# Patient Record
Sex: MALE | Race: WHITE | URBAN - METROPOLITAN AREA
[De-identification: names, ages, dates, MRNs, and addresses within clinical notes are randomized per-mention and may not be internally consistent; named-entity substitution may affect disease eponyms.]

---

## 2017-09-06 ENCOUNTER — TELEPHONE (OUTPATIENT)
Dept: FAMILY MEDICINE CLINIC | Age: 57
End: 2017-09-06

## 2017-09-06 RX ORDER — SULFAMETHOXAZOLE AND TRIMETHOPRIM 800; 160 MG/1; MG/1
1 TABLET ORAL 2 TIMES DAILY
Qty: 20 TABLET | Refills: 0 | Status: SHIPPED | OUTPATIENT
Start: 2017-09-06 | End: 2017-09-16

## 2018-09-14 ENCOUNTER — TELEPHONE (OUTPATIENT)
Dept: FAMILY MEDICINE CLINIC | Age: 58
End: 2018-09-14

## 2018-09-14 NOTE — TELEPHONE ENCOUNTER
Patient requesting a copy of all his prescriptions. Patient was in earlier to get his chart notes. Please contact patient.

## 2020-08-03 ENCOUNTER — TELEPHONE (OUTPATIENT)
Dept: FAMILY MEDICINE CLINIC | Age: 60
End: 2020-08-03

## 2020-08-03 NOTE — TELEPHONE ENCOUNTER
Pt called in wanting to speak to Charley. He would not elaborate as to what he needed to speak about.      Best call back number: [phone]

## 2020-08-04 RX ORDER — NICOTINE 21 MG/24HR
1 PATCH, TRANSDERMAL 24 HOURS TRANSDERMAL DAILY
Qty: 42 PATCH | Refills: 0 | Status: SHIPPED | OUTPATIENT
Start: 2020-08-04 | End: 2020-11-17 | Stop reason: SDUPTHER

## 2020-09-01 ENCOUNTER — OFFICE VISIT (OUTPATIENT)
Dept: FAMILY MEDICINE CLINIC | Age: 60
End: 2020-09-01
Payer: COMMERCIAL

## 2020-09-01 VITALS
DIASTOLIC BLOOD PRESSURE: 86 MMHG | OXYGEN SATURATION: 95 % | SYSTOLIC BLOOD PRESSURE: 138 MMHG | HEART RATE: 87 BPM | WEIGHT: 198 LBS | BODY MASS INDEX: 28.41 KG/M2 | RESPIRATION RATE: 16 BRPM

## 2020-09-01 PROBLEM — R03.0 WHITE COAT SYNDROME WITHOUT DIAGNOSIS OF HYPERTENSION: Status: ACTIVE | Noted: 2020-09-01

## 2020-09-01 LAB
A/G RATIO: 2.1 (ref 1.1–2.2)
ALBUMIN SERPL-MCNC: 4.5 G/DL (ref 3.4–5)
ALP BLD-CCNC: 47 U/L (ref 40–129)
ALT SERPL-CCNC: 17 U/L (ref 10–40)
ANION GAP SERPL CALCULATED.3IONS-SCNC: 11 MMOL/L (ref 3–16)
AST SERPL-CCNC: 16 U/L (ref 15–37)
BILIRUB SERPL-MCNC: 0.3 MG/DL (ref 0–1)
BUN BLDV-MCNC: 20 MG/DL (ref 7–20)
CALCIUM SERPL-MCNC: 9.5 MG/DL (ref 8.3–10.6)
CHLORIDE BLD-SCNC: 105 MMOL/L (ref 99–110)
CHOLESTEROL, TOTAL: 213 MG/DL (ref 0–199)
CO2: 24 MMOL/L (ref 21–32)
CREAT SERPL-MCNC: 1 MG/DL (ref 0.9–1.3)
GFR AFRICAN AMERICAN: >60
GFR NON-AFRICAN AMERICAN: >60
GLOBULIN: 2.1 G/DL
GLUCOSE BLD-MCNC: 103 MG/DL (ref 70–99)
HDLC SERPL-MCNC: 71 MG/DL (ref 40–60)
LDL CHOLESTEROL CALCULATED: 98 MG/DL
POTASSIUM SERPL-SCNC: 4.6 MMOL/L (ref 3.5–5.1)
SODIUM BLD-SCNC: 140 MMOL/L (ref 136–145)
TOTAL PROTEIN: 6.6 G/DL (ref 6.4–8.2)
TRIGL SERPL-MCNC: 221 MG/DL (ref 0–150)
VLDLC SERPL CALC-MCNC: 44 MG/DL

## 2020-09-01 PROCEDURE — 36415 COLL VENOUS BLD VENIPUNCTURE: CPT | Performed by: INTERNAL MEDICINE

## 2020-09-01 PROCEDURE — 99386 PREV VISIT NEW AGE 40-64: CPT | Performed by: INTERNAL MEDICINE

## 2020-09-01 RX ORDER — ASPIRIN 81 MG/1
81 TABLET, CHEWABLE ORAL DAILY
COMMUNITY
End: 2022-04-12

## 2020-09-01 ASSESSMENT — PATIENT HEALTH QUESTIONNAIRE - PHQ9
1. LITTLE INTEREST OR PLEASURE IN DOING THINGS: 0
SUM OF ALL RESPONSES TO PHQ QUESTIONS 1-9: 0
SUM OF ALL RESPONSES TO PHQ9 QUESTIONS 1 & 2: 0
2. FEELING DOWN, DEPRESSED OR HOPELESS: 0
SUM OF ALL RESPONSES TO PHQ QUESTIONS 1-9: 0

## 2020-09-01 NOTE — PROGRESS NOTES
Manuel    1960      Reason for visit:   Chief Complaint   Patient presents with    Establish Care        HPI:  Patient presents to establish care. Former patient of Dr. Dana Gregg. He is generally healthy. 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 Reports he had a full recovery as far as he knows but his kidney function has not been checked in quite some time. He is fasting today and has not had his cholesterol checked. He is a current smoker. He declines immunizations due to the history of GBS. He also declines preventative care services including PSA, colon cancer screening (all forms of colon cancer screening). He does want to establish care with me but does not have any concerns about his health otherwise. His blood pressure is a bit elevated today. He reports he monitors it at home and it is typically always 120/80. He goes to a tanning bed but declines referral for dermatology skin cancer screening. Also declines HIV and hepatitis screening. tobacco abuse: trying to quit as  had a stroke.  Smoked for 40 years x 1/4  Past Medical History:   Diagnosis Date    Guillain-[facility]) 1978    unknown etiology    Insomnia 11/17/2014           Current Outpatient Medications:     aspirin 81 MG chewable tablet, Take 81 mg by mouth daily, Disp: , Rfl:     nicotine (NICODERM CQ) 21 MG/24HR, Place 1 patch onto the skin daily (Patient not taking: Reported on 9/1/2020), Disp: 42 patch, Rfl: 0     No Known Allergies    Past Surgical History:   Procedure Laterality Date    OTHER SURGICAL HISTORY      None        Family History   Problem Relation Age of Onset    Stroke Mother 76    Diabetes Father         Social History     Socioeconomic History    Marital status:      Spouse name: Partnered- Wetzel Soulier    Number of children: 0    Years of education: Not on file    Highest education level: Not on file   Occupational History    Occupation: [employer] Owner   Social Needs    Financial resource strain: Not on file    Food insecurity     Worry: Not on file     Inability: Not on file   [employer] needs     Medical: Not on file     Non-medical: Not on file   Tobacco Use    Smoking status: Current Every Day Smoker     Packs/day: 0.50     Types: Cigarettes    Smokeless tobacco: Never Used    Tobacco comment: pre-contemplative   Substance and Sexual Activity    Alcohol use: Yes     Alcohol/week: 0.0 standard drinks     Comment: ocassionally    Drug use: Not on file    Sexual activity: Not on file   Lifestyle    Physical activity     Days per week: Not on file     Minutes per session: Not on file    Stress: Not on file   Relationships    Social connections     Talks on phone: Not on file     Gets together: Not on file     Attends Sikhism service: Not on file     Active member of club or organization: Not on file     Attends meetings of clubs or organizations: Not on file     Relationship status: Not on file    Intimate partner violence     Fear of current or ex partner: Not on file     Emotionally abused: Not on file     Physically abused: Not on file     Forced sexual activity: Not on file   Other Topics Concern    Not on file   Social History Narrative    Not on file       Review of Systems   Constitutional: Negative for chills, fatigue, fever and unexpected weight change. HENT: Negative for congestion, ear pain, sore throat and trouble swallowing. Eyes: Negative for pain and redness. Respiratory: Negative for cough and shortness of breath. Cardiovascular: Negative for chest pain, palpitations and leg swelling. Gastrointestinal: Negative for abdominal pain, blood in stool, constipation, diarrhea, nausea and vomiting. Endocrine: Negative for cold intolerance, heat intolerance, polydipsia and polyuria. Genitourinary: Negative for dysuria, frequency and hematuria.    Musculoskeletal: Negative for arthralgias, myalgias and joint swelling. Skin: Negative for rash. Neurological: Negative for weakness, numbness and headaches. Hematological: Negative for adenopathy. Does not bruise/bleed easily. Psychiatric/Behavioral: Negative for sleep disturbance. The patient is not nervous/anxious. No report of depression    Physical Exam:  /86   Pulse 87   Resp 16   Wt 198 lb (89.8 kg)   SpO2 95%   BMI 28.41 kg/m²   Constitutional: appears well-developed and well-nourished. Head: Normocephalic and atraumatic. Eyes: Pupils are equal, round, and reactive to light. Conjunctivae and EOM are normal.   Right Ear: External ear normal. TM normal  Left Ear: External ear normal. TM normal  Nose: Nose normal.   Mouth/Throat: Oropharynx is clear and moist.   Cardiovascular: Normal rate, regular rhythm and normal heart sounds. No murmur heard. Pulmonary/Chest: Effort normal. No respiratory distress. No wheezes, rhonchi, or crackles  Abdominal: Soft. Bowel sounds are normal. No distension. There is no tenderness. Musculoskeletal: Normal range of motion. No edema, tenderness or deformity. Lymphadenopathy: No cervical adenopathy. Neurological: alert. No cranial nerve deficit. Skin: Skin is warm and dry. Capillary refill takes less than 2 seconds. No rash noted. Psychiatric: Normal mood and affect. Assessment and Plan: Umm Thapa is a 61 y.o. male presenting today to [facility]. Stepan Cedillo was seen today for [facility]. Diagnoses and all orders for this visit:    Encounter for well adult exam without abnormal findings  Unfortunately, he declines all preventative care measures. He will contact me should he reconsider. Recommend annual exam.    -     Comprehensive Metabolic Panel    Tobacco abuse  Cutting back. Has tried Wellbutrin and Chantix in the past and had adverse effects.   He will continue to use nicotine patch as needed  Screening for lipid disorders  -     Lipid Panel    History of renal impairment  -     Comprehensive Metabolic Panel    White coat syndrome without diagnosis of hypertension  He will continue to monitor his blood pressure at home      Kay Dickerson M.D. Internal Medicine and Pediatrics  [facility]    Please be advised that portions of this note were dictated with the use of Dragon which may result in linguistic errors. Please contact me should there be any questions.

## 2020-09-02 DIAGNOSIS — Z12.5 PROSTATE CANCER SCREENING: ICD-10-CM

## 2020-09-02 LAB — PROSTATE SPECIFIC ANTIGEN: 0.21 NG/ML (ref 0–4)

## 2020-11-16 ENCOUNTER — TELEPHONE (OUTPATIENT)
Dept: FAMILY MEDICINE CLINIC | Age: 60
End: 2020-11-16

## 2020-11-16 NOTE — TELEPHONE ENCOUNTER
Is patch helpful? How much smoking now.      I recommend pneumonia, Tdap  and flu vaccine this year    Can order a fit or cologard for colon screening if agreeable

## 2020-11-17 RX ORDER — NICOTINE 21 MG/24HR
1 PATCH, TRANSDERMAL 24 HOURS TRANSDERMAL DAILY
Qty: 42 PATCH | Refills: 0 | Status: SHIPPED | OUTPATIENT
Start: 2020-11-17 | End: 2021-02-04

## 2020-11-17 NOTE — TELEPHONE ENCOUNTER
PT states he is maybe smoking 1 or two a day. Pt requesting same dose at this time    PT not interested at this time.

## 2021-02-03 DIAGNOSIS — Z72.0 TOBACCO ABUSE: Primary | ICD-10-CM

## 2021-02-03 NOTE — TELEPHONE ENCOUNTER
PT called in requesting the next step of his nicotine patches. Please send to the [address] in [place].

## 2021-02-04 RX ORDER — NICOTINE 21 MG/24HR
1 PATCH, TRANSDERMAL 24 HOURS TRANSDERMAL DAILY
Qty: 42 PATCH | Refills: 5 | Status: SHIPPED | OUTPATIENT
Start: 2021-02-04 | End: 2022-04-12 | Stop reason: ALTCHOICE

## 2022-04-12 ENCOUNTER — OFFICE VISIT (OUTPATIENT)
Dept: FAMILY MEDICINE CLINIC | Age: 62
End: 2022-04-12
Payer: COMMERCIAL

## 2022-04-12 VITALS
HEART RATE: 85 BPM | TEMPERATURE: 98.2 F | WEIGHT: 205 LBS | HEIGHT: 70 IN | DIASTOLIC BLOOD PRESSURE: 88 MMHG | SYSTOLIC BLOOD PRESSURE: 140 MMHG | BODY MASS INDEX: 29.35 KG/M2 | OXYGEN SATURATION: 96 %

## 2022-04-12 DIAGNOSIS — E78.5 DYSLIPIDEMIA: Primary | ICD-10-CM

## 2022-04-12 DIAGNOSIS — Z12.5 PROSTATE CANCER SCREENING: ICD-10-CM

## 2022-04-12 DIAGNOSIS — R73.01 ELEVATED FASTING BLOOD SUGAR: ICD-10-CM

## 2022-04-12 LAB
A/G RATIO: 2.1 (ref 1.1–2.2)
ALBUMIN SERPL-MCNC: 4.7 G/DL (ref 3.4–5)
ALP BLD-CCNC: 53 U/L (ref 40–129)
ALT SERPL-CCNC: 18 U/L (ref 10–40)
ANION GAP SERPL CALCULATED.3IONS-SCNC: 16 MMOL/L (ref 3–16)
AST SERPL-CCNC: 16 U/L (ref 15–37)
BILIRUB SERPL-MCNC: 0.3 MG/DL (ref 0–1)
BUN BLDV-MCNC: 16 MG/DL (ref 7–20)
CALCIUM SERPL-MCNC: 9.9 MG/DL (ref 8.3–10.6)
CHLORIDE BLD-SCNC: 104 MMOL/L (ref 99–110)
CHOLESTEROL, TOTAL: 213 MG/DL (ref 0–199)
CO2: 21 MMOL/L (ref 21–32)
CREAT SERPL-MCNC: 0.9 MG/DL (ref 0.8–1.3)
GFR AFRICAN AMERICAN: >60
GFR NON-AFRICAN AMERICAN: >60
GLUCOSE BLD-MCNC: 112 MG/DL (ref 70–99)
HDLC SERPL-MCNC: 66 MG/DL (ref 40–60)
LDL CHOLESTEROL CALCULATED: 131 MG/DL
POTASSIUM SERPL-SCNC: 4.7 MMOL/L (ref 3.5–5.1)
PROSTATE SPECIFIC ANTIGEN: 0.2 NG/ML (ref 0–4)
SODIUM BLD-SCNC: 141 MMOL/L (ref 136–145)
TOTAL PROTEIN: 6.9 G/DL (ref 6.4–8.2)
TRIGL SERPL-MCNC: 82 MG/DL (ref 0–150)
VLDLC SERPL CALC-MCNC: 16 MG/DL

## 2022-04-12 PROCEDURE — 99213 OFFICE O/P EST LOW 20 MIN: CPT | Performed by: FAMILY MEDICINE

## 2022-04-12 RX ORDER — CETIRIZINE HYDROCHLORIDE 10 MG/1
10 TABLET ORAL DAILY
COMMUNITY

## 2022-04-12 ASSESSMENT — PATIENT HEALTH QUESTIONNAIRE - PHQ9
SUM OF ALL RESPONSES TO PHQ9 QUESTIONS 1 & 2: 0
SUM OF ALL RESPONSES TO PHQ QUESTIONS 1-9: 0
2. FEELING DOWN, DEPRESSED OR HOPELESS: 0
SUM OF ALL RESPONSES TO PHQ QUESTIONS 1-9: 0
1. LITTLE INTEREST OR PLEASURE IN DOING THINGS: 0

## 2022-04-12 NOTE — PROGRESS NOTES
A/P:    Diagnosis Orders   1. Dyslipidemia  Comprehensive Metabolic Panel    Lipid Panel   2. Elevated fasting blood sugar  Hemoglobin A1C   3. Prostate cancer screening  PSA Screening     For his dyslipidemia, with elevated triglycerides on last check, will check above lab work    For his history of elevated fasting blood sugar, will check an A1c and glucose levels today    He declines any form of colon cancer screening    He would like to get a PSA, pros and cons of testing discussed    Follow-up in 6 months or sooner if needed    O: BP (!) 140/88   Pulse 85   Temp 98.2 °F (36.8 °C)   Ht 5' 10\" (1.778 m)   Wt 205 lb (93 kg)   SpO2 96%   BMI 29.41 kg/m²    Gen- NAD, pleasant  HEENT- Eyes without icterus or injection  Neck- Supple, no lymphadenopathy appreciated  Lungs- CTAB  Heart- RRR  Abd- Soft, non tender  Ext- No edema  Psych- Appropriate    S: CC-establish care  HPI-patient presents to establish care with new clinic provider. He reports that he is doing well. He has no concerns today. He is on the keto diet.     ROS- ROS  Denies fever, chills, night sweats  Denies headaches, sore throat, ear pain  Denies chest pain, dyspnea, palpitations  Denies nausea, vomiting, diarrhea  He reports chronic leg pain for which she has medical marijuana card, he reports pain came on after Guillain-Barré  Denies depression, anxiety  Denies rashes      Patient's chart was reviewed and updated

## 2022-04-13 LAB
ESTIMATED AVERAGE GLUCOSE: 119.8 MG/DL
HBA1C MFR BLD: 5.8 %

## 2022-11-01 ENCOUNTER — OFFICE VISIT (OUTPATIENT)
Dept: FAMILY MEDICINE CLINIC | Age: 62
End: 2022-11-01
Payer: COMMERCIAL

## 2022-11-01 VITALS
DIASTOLIC BLOOD PRESSURE: 65 MMHG | WEIGHT: 210 LBS | SYSTOLIC BLOOD PRESSURE: 130 MMHG | BODY MASS INDEX: 30.06 KG/M2 | HEART RATE: 84 BPM | HEIGHT: 70 IN | TEMPERATURE: 98.8 F | OXYGEN SATURATION: 95 %

## 2022-11-01 DIAGNOSIS — E78.5 DYSLIPIDEMIA: Primary | ICD-10-CM

## 2022-11-01 DIAGNOSIS — R73.03 PREDIABETES: ICD-10-CM

## 2022-11-01 DIAGNOSIS — T14.8XXA BRUISING: ICD-10-CM

## 2022-11-01 PROBLEM — G61.0 GBS (GUILLAIN-BARRE SYNDROME) (HCC): Status: ACTIVE | Noted: 2022-11-01

## 2022-11-01 LAB
BASOPHILS ABSOLUTE: 0.1 K/UL (ref 0–0.2)
BASOPHILS RELATIVE PERCENT: 0.9 %
EOSINOPHILS ABSOLUTE: 0.2 K/UL (ref 0–0.6)
EOSINOPHILS RELATIVE PERCENT: 2.2 %
HBA1C MFR BLD: 5.6 %
HCT VFR BLD CALC: 48.8 % (ref 40.5–52.5)
HEMOGLOBIN: 16.4 G/DL (ref 13.5–17.5)
LYMPHOCYTES ABSOLUTE: 2.7 K/UL (ref 1–5.1)
LYMPHOCYTES RELATIVE PERCENT: 38.5 %
MCH RBC QN AUTO: 32 PG (ref 26–34)
MCHC RBC AUTO-ENTMCNC: 33.6 G/DL (ref 31–36)
MCV RBC AUTO: 95.3 FL (ref 80–100)
MONOCYTES ABSOLUTE: 0.7 K/UL (ref 0–1.3)
MONOCYTES RELATIVE PERCENT: 9.7 %
NEUTROPHILS ABSOLUTE: 3.5 K/UL (ref 1.7–7.7)
NEUTROPHILS RELATIVE PERCENT: 48.7 %
PDW BLD-RTO: 13.2 % (ref 12.4–15.4)
PLATELET # BLD: 203 K/UL (ref 135–450)
PMV BLD AUTO: 9 FL (ref 5–10.5)
RBC # BLD: 5.12 M/UL (ref 4.2–5.9)
WBC # BLD: 7.1 K/UL (ref 4–11)

## 2022-11-01 PROCEDURE — 99214 OFFICE O/P EST MOD 30 MIN: CPT | Performed by: FAMILY MEDICINE

## 2022-11-01 PROCEDURE — 83036 HEMOGLOBIN GLYCOSYLATED A1C: CPT | Performed by: FAMILY MEDICINE

## 2022-11-01 NOTE — PROGRESS NOTES
A/P:    Diagnosis Orders   1. Dyslipidemia        2. Prediabetes  POCT glycosylated hemoglobin (Hb A1C)      3. Bruising  CBC with Auto Differential        His LDL was moderately elevated on last lab work, he will continue with lifestyle optimization, he is currently on a keto diet    For his prediabetes, check A1c    For his easy bruising, check CBC    He declines colon cancer screening    Follow-up in 6 months or sooner if needed    O: /65   Pulse 84   Temp 98.8 °F (37.1 °C)   Ht 5' 10\" (1.778 m)   Wt 210 lb (95.3 kg)   SpO2 95%   BMI 30.13 kg/m²    Gen- NAD, pleasant  HEENT- Eyes without icterus or injection  Neck- Supple, no lymphadenopathy appreciated  Lungs- CTAB  Heart- RRR  Abd- Soft, non tender  Ext- No edema, purpura of left forearm appreciated  Psych- Appropriate    S: CC-check up  HPI-patient presents for follow-up of dyslipidemia, prediabetes. He notes that for the past 5 to 10 years he has been getting some easy bruising. He is not on any aspirin, NSAIDs. He denies fever, chills, night sweats, other bleeding. Denies chest pain, dyspnea, palpitations.     ROS- Per HPI    Patient's medications, allergies, and past medical hx were reviewed

## 2023-08-24 ASSESSMENT — PATIENT HEALTH QUESTIONNAIRE - PHQ9
2. FEELING DOWN, DEPRESSED OR HOPELESS: NOT AT ALL
2. FEELING DOWN, DEPRESSED OR HOPELESS: 0
1. LITTLE INTEREST OR PLEASURE IN DOING THINGS: 0
SUM OF ALL RESPONSES TO PHQ QUESTIONS 1-9: 0
SUM OF ALL RESPONSES TO PHQ9 QUESTIONS 1 & 2: 0
SUM OF ALL RESPONSES TO PHQ QUESTIONS 1-9: 0
SUM OF ALL RESPONSES TO PHQ9 QUESTIONS 1 & 2: 0
1. LITTLE INTEREST OR PLEASURE IN DOING THINGS: NOT AT ALL

## 2023-08-25 ENCOUNTER — OFFICE VISIT (OUTPATIENT)
Dept: FAMILY MEDICINE CLINIC | Age: 63
End: 2023-08-25
Payer: COMMERCIAL

## 2023-08-25 VITALS
DIASTOLIC BLOOD PRESSURE: 70 MMHG | SYSTOLIC BLOOD PRESSURE: 130 MMHG | HEIGHT: 70 IN | HEART RATE: 74 BPM | WEIGHT: 211 LBS | TEMPERATURE: 97.1 F | BODY MASS INDEX: 30.21 KG/M2 | OXYGEN SATURATION: 97 %

## 2023-08-25 DIAGNOSIS — E78.5 MILD HYPERLIPIDEMIA: ICD-10-CM

## 2023-08-25 DIAGNOSIS — Z12.5 PROSTATE CANCER SCREENING: ICD-10-CM

## 2023-08-25 DIAGNOSIS — R14.0 BLOATING: Primary | ICD-10-CM

## 2023-08-25 DIAGNOSIS — Z86.69 HISTORY OF GUILLAIN-BARRE SYNDROME: ICD-10-CM

## 2023-08-25 PROBLEM — G61.0 GBS (GUILLAIN-BARRE SYNDROME) (HCC): Status: RESOLVED | Noted: 2022-11-01 | Resolved: 2023-08-25

## 2023-08-25 LAB
BASOPHILS # BLD: 0.1 K/UL (ref 0–0.2)
BASOPHILS NFR BLD: 0.9 %
DEPRECATED RDW RBC AUTO: 14.2 % (ref 12.4–15.4)
EOSINOPHIL # BLD: 0.2 K/UL (ref 0–0.6)
EOSINOPHIL NFR BLD: 2.1 %
HCT VFR BLD AUTO: 47.3 % (ref 40.5–52.5)
HGB BLD-MCNC: 16.3 G/DL (ref 13.5–17.5)
LYMPHOCYTES # BLD: 2.7 K/UL (ref 1–5.1)
LYMPHOCYTES NFR BLD: 33.3 %
MCH RBC QN AUTO: 31.9 PG (ref 26–34)
MCHC RBC AUTO-ENTMCNC: 34.5 G/DL (ref 31–36)
MCV RBC AUTO: 92.4 FL (ref 80–100)
MONOCYTES # BLD: 0.6 K/UL (ref 0–1.3)
MONOCYTES NFR BLD: 7 %
NEUTROPHILS # BLD: 4.6 K/UL (ref 1.7–7.7)
NEUTROPHILS NFR BLD: 56.7 %
PLATELET # BLD AUTO: 188 K/UL (ref 135–450)
PMV BLD AUTO: 9.4 FL (ref 5–10.5)
RBC # BLD AUTO: 5.12 M/UL (ref 4.2–5.9)
WBC # BLD AUTO: 8.1 K/UL (ref 4–11)

## 2023-08-25 PROCEDURE — 99214 OFFICE O/P EST MOD 30 MIN: CPT | Performed by: FAMILY MEDICINE

## 2023-08-26 LAB
ALBUMIN SERPL-MCNC: 4.4 G/DL (ref 3.4–5)
ALBUMIN/GLOB SERPL: 2 {RATIO} (ref 1.1–2.2)
ALP SERPL-CCNC: 54 U/L (ref 40–129)
ALT SERPL-CCNC: 20 U/L (ref 10–40)
ANION GAP SERPL CALCULATED.3IONS-SCNC: 14 MMOL/L (ref 3–16)
AST SERPL-CCNC: 17 U/L (ref 15–37)
BILIRUB SERPL-MCNC: 0.3 MG/DL (ref 0–1)
BUN SERPL-MCNC: 13 MG/DL (ref 7–20)
CALCIUM SERPL-MCNC: 9.6 MG/DL (ref 8.3–10.6)
CHLORIDE SERPL-SCNC: 101 MMOL/L (ref 99–110)
CO2 SERPL-SCNC: 21 MMOL/L (ref 21–32)
CREAT SERPL-MCNC: 1 MG/DL (ref 0.8–1.3)
CRP SERPL-MCNC: 8.6 MG/L (ref 0–5.1)
GFR SERPLBLD CREATININE-BSD FMLA CKD-EPI: >60 ML/MIN/{1.73_M2}
GLUCOSE SERPL-MCNC: 104 MG/DL (ref 70–99)
POTASSIUM SERPL-SCNC: 4.7 MMOL/L (ref 3.5–5.1)
PROT SERPL-MCNC: 6.6 G/DL (ref 6.4–8.2)
PSA SERPL DL<=0.01 NG/ML-MCNC: 0.2 NG/ML (ref 0–4)
SODIUM SERPL-SCNC: 136 MMOL/L (ref 136–145)
TSH SERPL DL<=0.005 MIU/L-ACNC: 1.5 UIU/ML (ref 0.27–4.2)

## 2023-08-28 LAB — H PYLORI BREATH TEST: NEGATIVE
